# Patient Record
Sex: FEMALE | Race: WHITE | NOT HISPANIC OR LATINO | URBAN - NONMETROPOLITAN AREA
[De-identification: names, ages, dates, MRNs, and addresses within clinical notes are randomized per-mention and may not be internally consistent; named-entity substitution may affect disease eponyms.]

---

## 2023-04-15 ENCOUNTER — HOSPITAL ENCOUNTER (INPATIENT)
Facility: HOSPITAL | Age: 38
LOS: 5 days | Discharge: HOME | DRG: 885 | End: 2023-04-20
Attending: PSYCHIATRY & NEUROLOGY | Admitting: PSYCHIATRY & NEUROLOGY
Payer: COMMERCIAL

## 2023-04-15 ENCOUNTER — HOSPITAL ENCOUNTER (EMERGENCY)
Facility: HOSPITAL | Age: 38
Discharge: PSYCHIATRIC OR SUBSTANCE USE FACILITY | DRG: 885 | End: 2023-04-15
Attending: EMERGENCY MEDICINE | Admitting: EMERGENCY MEDICINE
Payer: COMMERCIAL

## 2023-04-15 VITALS
WEIGHT: 250 LBS | TEMPERATURE: 97.7 F | HEIGHT: 64 IN | OXYGEN SATURATION: 96 % | HEART RATE: 99 BPM | DIASTOLIC BLOOD PRESSURE: 80 MMHG | RESPIRATION RATE: 17 BRPM | BODY MASS INDEX: 42.68 KG/M2 | SYSTOLIC BLOOD PRESSURE: 131 MMHG

## 2023-04-15 DIAGNOSIS — F29 PSYCHOSIS, UNSPECIFIED PSYCHOSIS TYPE: Primary | ICD-10-CM

## 2023-04-15 LAB
ALBUMIN SERPL-MCNC: 4.2 G/DL (ref 3.5–5.2)
ALBUMIN/GLOB SERPL: 1.2 G/DL
ALP SERPL-CCNC: 81 U/L (ref 39–117)
ALT SERPL W P-5'-P-CCNC: 18 U/L (ref 1–33)
ANION GAP SERPL CALCULATED.3IONS-SCNC: 12.6 MMOL/L (ref 5–15)
AST SERPL-CCNC: 25 U/L (ref 1–32)
BASOPHILS # BLD AUTO: 0.04 10*3/MM3 (ref 0–0.2)
BASOPHILS NFR BLD AUTO: 0.4 % (ref 0–1.5)
BILIRUB SERPL-MCNC: 0.3 MG/DL (ref 0–1.2)
BUN SERPL-MCNC: 11 MG/DL (ref 6–20)
BUN/CREAT SERPL: 14.9 (ref 7–25)
CALCIUM SPEC-SCNC: 9.3 MG/DL (ref 8.6–10.5)
CHLORIDE SERPL-SCNC: 109 MMOL/L (ref 98–107)
CO2 SERPL-SCNC: 20.4 MMOL/L (ref 22–29)
CREAT SERPL-MCNC: 0.74 MG/DL (ref 0.57–1)
DEPRECATED RDW RBC AUTO: 48.3 FL (ref 37–54)
EGFRCR SERPLBLD CKD-EPI 2021: 106.4 ML/MIN/1.73
EOSINOPHIL # BLD AUTO: 0.11 10*3/MM3 (ref 0–0.4)
EOSINOPHIL NFR BLD AUTO: 1 % (ref 0.3–6.2)
ERYTHROCYTE [DISTWIDTH] IN BLOOD BY AUTOMATED COUNT: 13.9 % (ref 12.3–15.4)
ETHANOL BLD-MCNC: <10 MG/DL (ref 0–10)
ETHANOL UR QL: <0.01 %
FLUAV RNA RESP QL NAA+PROBE: NOT DETECTED
FLUBV RNA RESP QL NAA+PROBE: NOT DETECTED
GLOBULIN UR ELPH-MCNC: 3.6 GM/DL
GLUCOSE SERPL-MCNC: 120 MG/DL (ref 65–99)
HCG SERPL QL: NEGATIVE
HCT VFR BLD AUTO: 47.1 % (ref 34–46.6)
HGB BLD-MCNC: 14.8 G/DL (ref 12–15.9)
IMM GRANULOCYTES # BLD AUTO: 0.02 10*3/MM3 (ref 0–0.05)
IMM GRANULOCYTES NFR BLD AUTO: 0.2 % (ref 0–0.5)
LYMPHOCYTES # BLD AUTO: 3.42 10*3/MM3 (ref 0.7–3.1)
LYMPHOCYTES NFR BLD AUTO: 32.6 % (ref 19.6–45.3)
MAGNESIUM SERPL-MCNC: 2.3 MG/DL (ref 1.6–2.6)
MCH RBC QN AUTO: 29.8 PG (ref 26.6–33)
MCHC RBC AUTO-ENTMCNC: 31.4 G/DL (ref 31.5–35.7)
MCV RBC AUTO: 94.8 FL (ref 79–97)
MONOCYTES # BLD AUTO: 0.69 10*3/MM3 (ref 0.1–0.9)
MONOCYTES NFR BLD AUTO: 6.6 % (ref 5–12)
NEUTROPHILS NFR BLD AUTO: 59.2 % (ref 42.7–76)
NEUTROPHILS NFR BLD AUTO: 6.22 10*3/MM3 (ref 1.7–7)
NRBC BLD AUTO-RTO: 0 /100 WBC (ref 0–0.2)
PLATELET # BLD AUTO: 247 10*3/MM3 (ref 140–450)
PMV BLD AUTO: 9.5 FL (ref 6–12)
POTASSIUM SERPL-SCNC: 4.5 MMOL/L (ref 3.5–5.2)
PROT SERPL-MCNC: 7.8 G/DL (ref 6–8.5)
RBC # BLD AUTO: 4.97 10*6/MM3 (ref 3.77–5.28)
SARS-COV-2 RNA RESP QL NAA+PROBE: NOT DETECTED
SODIUM SERPL-SCNC: 142 MMOL/L (ref 136–145)
WBC NRBC COR # BLD: 10.5 10*3/MM3 (ref 3.4–10.8)

## 2023-04-15 PROCEDURE — 99285 EMERGENCY DEPT VISIT HI MDM: CPT

## 2023-04-15 PROCEDURE — 82077 ASSAY SPEC XCP UR&BREATH IA: CPT | Performed by: EMERGENCY MEDICINE

## 2023-04-15 PROCEDURE — 85025 COMPLETE CBC W/AUTO DIFF WBC: CPT | Performed by: EMERGENCY MEDICINE

## 2023-04-15 PROCEDURE — 80053 COMPREHEN METABOLIC PANEL: CPT | Performed by: EMERGENCY MEDICINE

## 2023-04-15 PROCEDURE — C9803 HOPD COVID-19 SPEC COLLECT: HCPCS

## 2023-04-15 PROCEDURE — 83735 ASSAY OF MAGNESIUM: CPT | Performed by: EMERGENCY MEDICINE

## 2023-04-15 PROCEDURE — 93010 ELECTROCARDIOGRAM REPORT: CPT | Performed by: SPECIALIST

## 2023-04-15 PROCEDURE — 36415 COLL VENOUS BLD VENIPUNCTURE: CPT

## 2023-04-15 PROCEDURE — 93005 ELECTROCARDIOGRAM TRACING: CPT | Performed by: PSYCHIATRY & NEUROLOGY

## 2023-04-15 PROCEDURE — 84703 CHORIONIC GONADOTROPIN ASSAY: CPT | Performed by: EMERGENCY MEDICINE

## 2023-04-15 PROCEDURE — 96372 THER/PROPH/DIAG INJ SC/IM: CPT

## 2023-04-15 PROCEDURE — 25010000002 ZIPRASIDONE MESYLATE PER 10 MG: Performed by: PHYSICIAN ASSISTANT

## 2023-04-15 PROCEDURE — 87636 SARSCOV2 & INF A&B AMP PRB: CPT | Performed by: EMERGENCY MEDICINE

## 2023-04-15 RX ORDER — ONDANSETRON 4 MG/1
4 TABLET, FILM COATED ORAL EVERY 6 HOURS PRN
Status: DISCONTINUED | OUTPATIENT
Start: 2023-04-15 | End: 2023-04-20 | Stop reason: HOSPADM

## 2023-04-15 RX ORDER — LOPERAMIDE HYDROCHLORIDE 2 MG/1
2 CAPSULE ORAL
Status: DISCONTINUED | OUTPATIENT
Start: 2023-04-15 | End: 2023-04-20 | Stop reason: HOSPADM

## 2023-04-15 RX ORDER — FAMOTIDINE 20 MG/1
20 TABLET, FILM COATED ORAL 2 TIMES DAILY PRN
Status: DISCONTINUED | OUTPATIENT
Start: 2023-04-15 | End: 2023-04-20 | Stop reason: HOSPADM

## 2023-04-15 RX ORDER — HYDROXYZINE 50 MG/1
50 TABLET, FILM COATED ORAL EVERY 6 HOURS PRN
Status: DISCONTINUED | OUTPATIENT
Start: 2023-04-15 | End: 2023-04-20 | Stop reason: HOSPADM

## 2023-04-15 RX ORDER — NICOTINE 21 MG/24HR
1 PATCH, TRANSDERMAL 24 HOURS TRANSDERMAL
Status: DISCONTINUED | OUTPATIENT
Start: 2023-04-15 | End: 2023-04-20 | Stop reason: HOSPADM

## 2023-04-15 RX ORDER — TRAZODONE HYDROCHLORIDE 50 MG/1
50 TABLET ORAL NIGHTLY PRN
Status: DISCONTINUED | OUTPATIENT
Start: 2023-04-15 | End: 2023-04-20 | Stop reason: HOSPADM

## 2023-04-15 RX ORDER — IBUPROFEN 400 MG/1
400 TABLET ORAL EVERY 6 HOURS PRN
Status: DISCONTINUED | OUTPATIENT
Start: 2023-04-15 | End: 2023-04-20 | Stop reason: HOSPADM

## 2023-04-15 RX ORDER — BENZONATATE 100 MG/1
100 CAPSULE ORAL 3 TIMES DAILY PRN
Status: DISCONTINUED | OUTPATIENT
Start: 2023-04-15 | End: 2023-04-20 | Stop reason: HOSPADM

## 2023-04-15 RX ORDER — BENZTROPINE MESYLATE 1 MG/ML
1 INJECTION INTRAMUSCULAR; INTRAVENOUS ONCE AS NEEDED
Status: DISCONTINUED | OUTPATIENT
Start: 2023-04-15 | End: 2023-04-20 | Stop reason: HOSPADM

## 2023-04-15 RX ORDER — ACETAMINOPHEN 325 MG/1
650 TABLET ORAL EVERY 6 HOURS PRN
Status: DISCONTINUED | OUTPATIENT
Start: 2023-04-15 | End: 2023-04-20 | Stop reason: HOSPADM

## 2023-04-15 RX ORDER — ALUMINA, MAGNESIA, AND SIMETHICONE 2400; 2400; 240 MG/30ML; MG/30ML; MG/30ML
15 SUSPENSION ORAL EVERY 6 HOURS PRN
Status: DISCONTINUED | OUTPATIENT
Start: 2023-04-15 | End: 2023-04-20 | Stop reason: HOSPADM

## 2023-04-15 RX ORDER — BENZTROPINE MESYLATE 1 MG/1
2 TABLET ORAL ONCE AS NEEDED
Status: DISCONTINUED | OUTPATIENT
Start: 2023-04-15 | End: 2023-04-20 | Stop reason: HOSPADM

## 2023-04-15 RX ORDER — ECHINACEA PURPUREA EXTRACT 125 MG
2 TABLET ORAL AS NEEDED
Status: DISCONTINUED | OUTPATIENT
Start: 2023-04-15 | End: 2023-04-20 | Stop reason: HOSPADM

## 2023-04-15 RX ADMIN — WATER 10 MG: 1 INJECTION INTRAMUSCULAR; INTRAVENOUS; SUBCUTANEOUS at 17:37

## 2023-04-15 NOTE — ED NOTES
I called [place] [employer] police [place] and spoke with Sam he is going to try to find out name of fito who assisted the pt to the er

## 2023-04-15 NOTE — NURSING NOTE
Attempted to call Dr. JOE muñoz multiple times due to patient  Attempting to leave and screaming hysterically,  Trying to call 911 on hospital phone stating she is not in the ER she is in urgent care.  no response at this time.

## 2023-04-15 NOTE — NURSING NOTE
"Pt demanding us to call police, that she is not staying, wants us to call her  after she was advised she would be on a 72 hour hold, and that she wants her family here, she is currently screaming at staff, cursing, and stating she is a hostage and this is not legal, she is not being stabbed with a needle, calling staff \"vampires\". Security present and Lead nurse Kely present.  "
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
## 2023-04-15 NOTE — NURSING NOTE
Face to face completed.  Patient was placed on a brief hold to administer medication.   Patient on a hold trying to leave and having very paranoid behavior and threatening staff.  No injuries occurred to patient during  Brief hold.  Patient wouldn't cooperate with a head to toe assessment but no marks noted and patient breathing and talking without difficulty.  Dr. Walton notified of findings and said ok for brief hold to end.

## 2023-04-15 NOTE — NURSING NOTE
Pt is on phone in intake office on phone, dialing 911, asking 911 to dispatch an escort to a patient of hers to the ER next door. Pt also stated she would just go sit back in the office since she is making everyone uncomfortable and that she is standing in a room full of people who cannot tell the truth.
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
## 2023-04-15 NOTE — NURSING NOTE
Spoke to DR. JOE muñoz regarding pt refusing a urine sample, he reports that pt can be admitted without urine sample, new order to get a urine sample in AM. [id number]

## 2023-04-15 NOTE — NURSING NOTE
Pt refusing blood work at this time. She states she is scared of needles and does not want to be poked.

## 2023-04-15 NOTE — NURSING NOTE
Spoke with DR. JOE muñoz new order to admit pt on a 72 hour hold due to patient currently being a risk to herself and others. SP3 routine orders. [id number]

## 2023-04-15 NOTE — ED NOTES
"Patient refused lab work at this time, stated \"I signed up for this, but I did not sign up for needles. You're not sticking me.\" Provider made aware.  "

## 2023-04-15 NOTE — NURSING NOTE
Pt repeatedly attempting to leave intake department, stating her boyfriend dropped her off here, I notified security of patient attempting to elope and advised her she needed to stay back here until ED provider could give her discharge papers to her, security advised there is no boyfriend and that the State Police dropped the patient off.

## 2023-04-15 NOTE — NURSING NOTE
Pt becomes increasingly irritable and agitated due to having to stay due to order of 72 hour hold. Staff attempted to deescalate the situation pt did not respond to staff interventions and continued to become violent with staff. For the patient and staffs safety pt was placed in a brief hold lasting from 6971-1123. Pt was administered Geodon 10 mg. Pt tolerated well no s/s of injury noted.

## 2023-04-16 LAB
QT INTERVAL: 408 MS
QTC INTERVAL: 417 MS

## 2023-04-16 PROCEDURE — 99223 1ST HOSP IP/OBS HIGH 75: CPT | Performed by: PSYCHIATRY & NEUROLOGY

## 2023-04-16 RX ORDER — RISPERIDONE 1 MG/1
1 TABLET ORAL EVERY 12 HOURS SCHEDULED
Status: DISCONTINUED | OUTPATIENT
Start: 2023-04-16 | End: 2023-04-19

## 2023-04-16 NOTE — NURSING NOTE
"PATIENT ASKED TO BE ALLOWED TO GET NUMBERS OFF OF HER PHONE AND STAFF ATTEMPTED TO HELP HER. PATIENT TOOK PHONE TO HER ROOM AND ATTEMPTED TO HIDE IT. STAFF ASKED PATIENT TO PLEASE RETURN THE PHONE AND SHE REFUSED. SECURITY WAS CALLED AND THE PATIENT RETURNED THE PHONE TO THEM. STAFF KEILA PHONE BACK UP IN THE UNIT SAFE. PATIENT STATES TO STAFF \" I JUST WANT TO ANNOUNCE TO EVERYONE, I HAVE NOTHING LEFT TO LOSE.\" PATIENT RETURNED TO HER ROOM AT THIS TIME.   "

## 2023-04-16 NOTE — NURSING NOTE
ASKED PATIENT TO PROVIDE URINE SAMPLE WHEN SHE WAS ABLE. PATIENT BECAME VERY ANGRY AND BEGAN CURSING STAFF, DEMANDING TO BE DISCHARGED. PATIENT WAS REDIRECTED AND INFORMED THAT SHE WOULD SEE A DOCTOR TODAY AND THAT SHE COULD DISCUSS DISCHARGE WITH THE MD.
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
## 2023-04-16 NOTE — PLAN OF CARE
Goal Outcome Evaluation:           Progress: no change       Pt is a new admission and has been oriented to the unit.

## 2023-04-16 NOTE — PLAN OF CARE
Goal Outcome Evaluation:  Plan of Care Reviewed With: patient  Patient Agreement with Plan of Care: agrees        Outcome Evaluation: PATIENT DENIES ANY PROBLEMS THIS SHIFT BUT IS ANTAGONISTIC, THREATENING TOWARDS STAFF. PATIENT APPEARS DELUSIONAL AND PARANOID, DISPLAYING VERY LITTLE COOPERATION WITH STAFF. NO S/S OF ACUTE DISTRESS NOTED.

## 2023-04-17 PROCEDURE — 99232 SBSQ HOSP IP/OBS MODERATE 35: CPT | Performed by: PSYCHIATRY & NEUROLOGY
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
## 2023-04-17 NOTE — PLAN OF CARE
Problem: Adult Behavioral Health Plan of Care  Goal: Plan of Care Review  Outcome: Ongoing, Progressing  Flowsheets  Taken 4/17/2023 1352  Progress: improving  Outcome Evaluation: PATIENT SEEMS MORE AGREEABLE THIS SHIFT. PATIENT DENIES ANY PROBLEMS THIS SHIFT AND NO S/S OF ACUTE DISTRESS NOTED. PATIENT IS AOX3 AND COOPERATIVE. NNO NOTED  Taken 4/17/2023 0717  Plan of Care Reviewed With: patient  Patient Agreement with Plan of Care: agrees   Goal Outcome Evaluation:  Plan of Care Reviewed With: patient  Patient Agreement with Plan of Care: agrees     Progress: improving  Outcome Evaluation: PATIENT SEEMS MORE AGREEABLE THIS SHIFT. PATIENT DENIES ANY PROBLEMS THIS SHIFT AND NO S/S OF ACUTE DISTRESS NOTED. PATIENT IS AOX3 AND COOPERATIVE. NNO NOTED
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
## 2023-04-17 NOTE — PLAN OF CARE
Goal Outcome Evaluation:  Plan of Care Reviewed With: patient  Patient Agreement with Plan of Care: agrees     Progress: no change       Pt was answered the assessment questions but was irritated with staff. Pt rated anxiety 0/10 and depression 0/10. Pt denied SI, HI, and AVH. Pt reported having nightmares last night and feeling tired.

## 2023-04-18 PROCEDURE — 99232 SBSQ HOSP IP/OBS MODERATE 35: CPT | Performed by: PSYCHIATRY & NEUROLOGY

## 2023-04-18 RX ADMIN — RISPERIDONE 1 MG: 1 TABLET, FILM COATED ORAL at 21:04
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
## 2023-04-18 NOTE — PLAN OF CARE
Problem: Adult Behavioral Health Plan of Care  Goal: Plan of Care Review  Outcome: Ongoing, Progressing  Flowsheets  Taken 4/18/2023 1419  Progress: no change  Outcome Evaluation: PATIENT DENIES ANY PROBLEMS THIS SHIFT. NO CHANGE IN PATIENT CONDITION NOTED. NO S/S OF ACUTE DISTRESS NOTED. NNO NOTED.  Taken 4/18/2023 0722  Plan of Care Reviewed With: patient  Patient Agreement with Plan of Care: agrees   Goal Outcome Evaluation:  Plan of Care Reviewed With: patient  Patient Agreement with Plan of Care: agrees     Progress: no change  Outcome Evaluation: PATIENT DENIES ANY PROBLEMS THIS SHIFT. NO CHANGE IN PATIENT CONDITION NOTED. NO S/S OF ACUTE DISTRESS NOTED. NNO NOTED.

## 2023-04-18 NOTE — PROGRESS NOTES
"INPATIENT PSYCHIATRIC PROGRESS NOTE    Name:  Karina Galvan  :  1985  MRN:  [id number]  Visit Number:  [id number]  Length of stay:  3    SUBJECTIVE    CC/Focus of Exam: Psychosis    INTERVAL HISTORY:  The patient states she is feeling better today and is not as worried about her boyfriend and her kids as she was previously. She denies feeling hopeless or suicidal. She states she tried to call her boyfriend but couldn't get through. She states that her boyfriend will pick her up when she is ready to leave here.   Depression rating 0/10  Anxiety rating 0/10  Sleep: good  Withdrawal sx: denies  Cravin/10    Review of Systems   Constitutional: Negative.    Respiratory: Negative.    Cardiovascular: Negative.    Gastrointestinal: Negative.        OBJECTIVE    Temp:  [97.4 °F (36.3 °C)-97.6 °F (36.4 °C)] 97.6 °F (36.4 °C)  Heart Rate:  [73-84] 84  Resp:  [18] 18  BP: (101-126)/(62-84) 126/84    MENTAL STATUS EXAM:  Appearance:Casually dressed, good hygeine.   Cooperation:Cooperative  Psychomotor: No psychomotor agitation/retardation, No EPS, No motor tics  Speech-normal rate, amount.  Mood \"good\"   Affect-restricted  Thought Content-delusional material present  Thought process-disorganized.  Suicidality: No SI  Homicidality: No HI  Perception: No AH/VH  Insight-poor   Judgement-poor    Lab Results (last 24 hours)     ** No results found for the last 24 hours. **             Imaging Results (Last 24 Hours)     ** No results found for the last 24 hours. **             ECG/EMG Results (most recent)     Procedure Component Value Units Date/Time    ECG 12 Lead Other [[id number]] Collected: 04/15/23 2126     Updated: 23 1138     QT Interval 408 ms      QTC Interval 417 ms     Narrative:      Test Reason : Baseline Cardiac Status~  Blood Pressure :   */*   mmHG  Vent. Rate :  63 BPM     Atrial Rate :  63 BPM     P-R Int : 130 ms          QRS Dur :  94 ms      QT Int : 408 ms       P-R-T Axes :  33  74  59 " degrees     QTc Int : 417 ms    Normal sinus rhythm  Normal ECG  No previous ECGs available  Confirmed by Hank Sam ([id number]) on 4/16/2023 11:38:27 AM    Referred By:            Confirmed By: Hank Sam           ALLERGIES: Penicillins    Medication Review:   Scheduled Medications:  nicotine, 1 patch, Transdermal, Q24H  risperiDONE, 1 mg, Oral, Q12H         PRN Medications:  •  acetaminophen  •  aluminum-magnesium hydroxide-simethicone  •  benzonatate  •  benztropine **OR** benztropine  •  famotidine  •  hydrOXYzine  •  ibuprofen  •  loperamide  •  magnesium hydroxide  •  ondansetron  •  sodium chloride  •  traZODone   All medications reviewed.    ASSESSMENT & PLAN:      Psychosis  -Patient appears to have an acute stress reaction and continues to be paranoid and delusional, appears to be doing some better.   -Continue risperidone  -Encouraged patient to work on the aftercare plan and if she continues to do well, she may be discharged tomorrow.     Special precautions: Special Precautions Level 3 (q15 min checks) .    Behavioral Health Treatment Plan and Problem List: I have reviewed and approved the Behavioral Health Treatment Plan and Problem list.  The patient has had a chance to review and agrees with the treatment plan.    Copied text in portions of this note has been reviewed and is accurate as of 04/18/23         Clinician:  Macy Tomlin MD  04/18/23  07:38 EDT

## 2023-04-18 NOTE — PLAN OF CARE
Goal Outcome Evaluation:  Plan of Care Reviewed With: patient  Patient Agreement with Plan of Care: agrees     Progress: no change  Outcome Evaluation: Pt was calm and cooperative this shift with no issues or concerns.

## 2023-04-19 PROBLEM — F20.1 DISORGANIZED SCHIZOPHRENIA: Status: ACTIVE | Noted: 2023-04-15

## 2023-04-19 PROCEDURE — 99232 SBSQ HOSP IP/OBS MODERATE 35: CPT | Performed by: PSYCHIATRY & NEUROLOGY

## 2023-04-19 PROCEDURE — 25010000002 PALIPERIDONE PALMITATE 234 MG/1.5ML SUSPENSION PREFILLED SYRINGE: Performed by: PSYCHIATRY & NEUROLOGY

## 2023-04-19 RX ADMIN — PALIPERIDONE PALMITATE 234 MG: 234 INJECTION INTRAMUSCULAR at 10:33

## 2023-04-19 NOTE — PLAN OF CARE
Goal Outcome Evaluation:  Plan of Care Reviewed With: patient  Patient Agreement with Plan of Care: agrees     Progress: no change  Outcome Evaluation: Pt calm and cooperative this shift. Pt still has a response lag when answering questions and still isolating to her room and sleeping for the majoirty of the shift.

## 2023-04-19 NOTE — PROGRESS NOTES
"INPATIENT PSYCHIATRIC PROGRESS NOTE    Name:  Karina Galvan  :  1985  MRN:  [id number]  Visit Number:  [id number]  Length of stay:  4    SUBJECTIVE    CC/Focus of Exam: Psychosis    INTERVAL HISTORY:  The patient's mother provided collateral information, where patient has been diagnosed with schizophrenia and was living in [place] with her parents, and took off in her father's car without permission and drove to [place] to be with her boyfriend.     Depression rating 0/10  Anxiety rating 0/10  Sleep: good  Withdrawal sx: denies  Cravin/10    Review of Systems   Constitutional: Negative.    Respiratory: Negative.    Cardiovascular: Negative.    Gastrointestinal: Negative.        OBJECTIVE    Temp:  [98.1 °F (36.7 °C)] 98.1 °F (36.7 °C)  Heart Rate:  [99] 99  Resp:  [18] 18  BP: (110)/(76) 110/76    MENTAL STATUS EXAM:  Appearance:Casually dressed, good hygeine.   Cooperation:Cooperative  Psychomotor: No psychomotor agitation/retardation, No EPS, No motor tics  Speech-normal rate, amount.  Mood \"good\"   Affect-restricted  Thought Content-delusional material present  Thought process-disorganized.  Suicidality: No SI  Homicidality: No HI  Perception: No AH/VH  Insight-poor   Judgement-poor    Lab Results (last 24 hours)     ** No results found for the last 24 hours. **             Imaging Results (Last 24 Hours)     ** No results found for the last 24 hours. **             ECG/EMG Results (most recent)     Procedure Component Value Units Date/Time    ECG 12 Lead Other [[id number]] Collected: 04/15/23 2126     Updated: 23 1138     QT Interval 408 ms      QTC Interval 417 ms     Narrative:      Test Reason : Baseline Cardiac Status~  Blood Pressure :   */*   mmHG  Vent. Rate :  63 BPM     Atrial Rate :  63 BPM     P-R Int : 130 ms          QRS Dur :  94 ms      QT Int : 408 ms       P-R-T Axes :  33  74  59 degrees     QTc Int : 417 ms    Normal sinus rhythm  Normal ECG  No previous ECGs available  Confirmed " by Hank Sam ([id number]) on 4/16/2023 11:38:27 AM    Referred By:            Confirmed By: Hank Sam           ALLERGIES: Penicillins    Medication Review:   Scheduled Medications:  nicotine, 1 patch, Transdermal, Q24H  risperiDONE, 1 mg, Oral, Q12H         PRN Medications:  •  acetaminophen  •  aluminum-magnesium hydroxide-simethicone  •  benzonatate  •  benztropine **OR** benztropine  •  famotidine  •  hydrOXYzine  •  ibuprofen  •  loperamide  •  magnesium hydroxide  •  ondansetron  •  sodium chloride  •  traZODone   All medications reviewed.    ASSESSMENT & PLAN:      Disorganized schizophrenia  -Patient has been refusing oral risperidone because she believes it was never ordered for her. She lacks insight and continues to be disorganized and delusional. Per report she has been non-compliant with her treatments in the past. She will be started on Invega sustenna and she has reluctantly agreed. She will get a dose of Invega sustenna 234 mg IM today, and 156 mg IM in a week and then 117 mg IM monthly.     Special precautions: Special Precautions Level 3 (q15 min checks) .    Behavioral Health Treatment Plan and Problem List: I have reviewed and approved the Behavioral Health Treatment Plan and Problem list.  The patient has had a chance to review and agrees with the treatment plan.    Copied text in portions of this note has been reviewed and is accurate as of 04/19/23         Clinician:  Macy Tomlin MD  04/19/23  09:01 EDT

## 2023-04-19 NOTE — PLAN OF CARE
Goal Outcome Evaluation:   Progress: no change  Outcome Evaluation: Therapist met with patient to review plan of care. Patient unable to participate at this time.       DATA:      Therapist discussed case with RN and met with patient today to review coping skills, review plan of care, and discuss discharge.     Clinical Maneuvering/Intervention:     Therapist assisted patient in processing above session content; acknowledged and normalized patient’s thoughts, feelings, and concerns.  Discussed the therapist/patient relationship and explain the parameters and limitations of relative confidentiality.  Also discussed the importance of active participation, and honesty to the treatment process.  Encouraged the patient to discuss/vent their feelings, frustrations, and fears concerning their ongoing medical issues and validated their feelings.     Allowed patient to freely discuss issues without interruption or judgment. Provided safe, confidential environment to facilitate the development of positive therapeutic relationship and encourage open, honest communication.      Therapist addressed discharge safety planning this date. Assisted patient in identifying risk factors which would indicate the need for higher level of care after discharge;  including thoughts to harm self or others and/or self-harming behavior. Encouraged patient to call 911, or present to the nearest emergency room should any of these events occur. Discussed crisis intervention services and means to access.  Encouraged securing any objects of harm.    Therapist completed integrated summary, treatment plan, and initiated social history this date.  Therapist is strongly encouraging family involvement in treatment.       Encouraged mask wearing, social distancing, and regular hand washing due to COVID19 risk.      ASSESSMENT:      Therapist met 1:1 with patient today. Patient denies suicidal ideation. Patient denies homicidal ideation. Patient denies  [facility]. Patient is calm and cooperative with assessment. She continues to be guarded and vague. Patient reports she does remember coming to the hospital and doesn't think she was confused or behaving erratically when she came. Patient is hesitant to accept the Invega injection, but is agreeable. Therapist educated patient on the benefits; she verbalized understanding.      PLAN:       Patient to remain hospitalized this date.      Treatment team will focus efforts on stabilizing patient's acute symptoms while providing education on healthy coping and crisis management to reduce hospitalizations.   Patient requires daily psychiatrist evaluation and 24/7 nursing supervision to promote patient  safety.     Therapist will offer 1-4 individual sessions, 1 therapy group daily, family education, and appropriate referral.

## 2023-04-19 NOTE — PLAN OF CARE
Goal Outcome Evaluation:  Plan of Care Reviewed With: patient  Patient Agreement with Plan of Care: agrees     Progress: improving  Outcome Evaluation: Pt has been calm and cooperative throughout shift. Pt remains in room mostly, however comes into the day room at times. Pt refused AM medications, however was compliant with taking first dose of Invega after speaking with MD. Pt rates anxiety 5, depression 0, states sleep and appetite are good. Pt denies SI/HI/AVH, states she is just ready to go home.

## 2023-04-20 VITALS
RESPIRATION RATE: 18 BRPM | HEIGHT: 64 IN | HEART RATE: 91 BPM | OXYGEN SATURATION: 98 % | SYSTOLIC BLOOD PRESSURE: 105 MMHG | DIASTOLIC BLOOD PRESSURE: 79 MMHG | TEMPERATURE: 97.8 F | WEIGHT: 178.8 LBS | BODY MASS INDEX: 30.52 KG/M2
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
## 2023-04-20 NOTE — DISCHARGE INSTR - APPOINTMENTS
[facility]  [address], [place] [address]  [phone]    May 8 2023 for assessment

## 2023-04-20 NOTE — PROGRESS NOTES
On date of discharge, patient is calm and cooperative. Patient denies SI/HI/AVH. Patient is alert and oriented x4. Patient denies any concerns or needs prior to discharge. She is future-oriented, making plans to return to [place] and follow up with her outpatient appointment. Therapist advised the patient to return to the nearest ED/contact 911 if she experiences SI/HI/AVH. Patient is agreeable.     Therapist contacted patient's mother, Mabel, to provide her with an update. Therapist advised her of patient's discharge. Therapist advised her that the patient should not have access to weapons/firearms when she returns home; Mabel is agreeable. She requests that the patient call her when she gets on the road to return home; therapist informed the patient and she is agreeable. She denies any additional concerns or questions.

## 2023-04-20 NOTE — PLAN OF CARE
Goal Outcome Evaluation:  Plan of Care Reviewed With: patient  Patient Agreement with Plan of Care: agrees     Progress: improving  Outcome Evaluation: Pt calm and cooperative, denies anx and mdd, denies SI/HI/AVH, 72 hr hold ended 4-19 @ 1835, appeared to have slept through the night.